# Patient Record
Sex: FEMALE | URBAN - METROPOLITAN AREA
[De-identification: names, ages, dates, MRNs, and addresses within clinical notes are randomized per-mention and may not be internally consistent; named-entity substitution may affect disease eponyms.]

---

## 2022-07-19 LAB
ANTIBODY SCREEN, EXTERNAL: NEGATIVE
CHLAMYDIA, EXTERNAL: NEGATIVE
HBSAG, EXTERNAL: NEGATIVE
HIV, EXTERNAL: NEGATIVE
N. GONORRHEA, EXTERNAL: NEGATIVE
RPR, EXTERNAL: NORMAL
RUBELLA, EXTERNAL: NORMAL
TYPE, ABO & RH, EXTERNAL: NORMAL

## 2023-01-25 LAB — GRBS, EXTERNAL: NEGATIVE

## 2023-02-10 ENCOUNTER — HOSPITAL ENCOUNTER (EMERGENCY)
Age: 28
Discharge: HOME | End: 2023-02-10
Attending: STUDENT IN AN ORGANIZED HEALTH CARE EDUCATION/TRAINING PROGRAM | Admitting: OBSTETRICS & GYNECOLOGY

## 2023-02-10 VITALS
BODY MASS INDEX: 25.27 KG/M2 | DIASTOLIC BLOOD PRESSURE: 67 MMHG | WEIGHT: 161 LBS | HEART RATE: 85 BPM | HEIGHT: 67 IN | OXYGEN SATURATION: 100 % | RESPIRATION RATE: 16 BRPM | TEMPERATURE: 98.9 F | SYSTOLIC BLOOD PRESSURE: 112 MMHG

## 2023-02-10 PROCEDURE — 75810000275 HC EMERGENCY DEPT VISIT NO LEVEL OF CARE

## 2023-02-10 PROCEDURE — 59025 FETAL NON-STRESS TEST: CPT

## 2023-02-10 PROCEDURE — 99283 EMERGENCY DEPT VISIT LOW MDM: CPT

## 2023-02-10 RX ORDER — DIPHENHYDRAMINE HCL 25 MG
25 CAPSULE ORAL ONCE
Status: DISCONTINUED | OUTPATIENT
Start: 2023-02-10 | End: 2023-02-10 | Stop reason: HOSPADM

## 2023-02-10 NOTE — H&P
History and Physical    Patient: Annika Deal MRN: [id number]  SSN: [id number]    YOB: 1995  Age: 24 y.o. Sex: female      Subjective:      Annika Deal is a 24 y.o. female  @ 39.0 wks gestation with KELSEY 2023 who reports \"Feeling tired of being pregnant and wanting an Elective Induction\". Reports not having had good sleep in the last couple of nights\", and only feels sporadic master anderson contractions. History reviewed. No pertinent past medical history. Past Surgical History:   Procedure Laterality Date    HX OTHER SURGICAL      wisdom teeth 2018      History reviewed. No pertinent family history. Social History     Tobacco Use    Smoking status: Never    Smokeless tobacco: Never   Substance Use Topics    Alcohol use: Not on file      Prior to Admission medications    Medication Sig Start Date End Date Taking? Authorizing Provider   PNV Comb #2-Iron-FA-Omega 3 29-1-400 mg cmpk Take  by mouth. Yes Provider, Historical        No Known Allergies    Review of Systems:  A comprehensive review of systems was negative except for that written in the History of Present Illness. Objective:     Vitals:    02/10/23 0033 02/10/23 003   BP:  112/67   Pulse:  85   Resp:  16   Temp:  98.9 °F (37.2 °C)   SpO2:  100%   Weight: 73 kg (161 lb)    Height: 5' 7\" (1.702 m)         Physical Exam:  EYE: negative  LUNG: clear to auscultation bilaterally  HEART: regular rate and rhythm, S1, S2 normal, no murmur, click, rub or gallop  ABDOMEN: soft, non-tender. Bowel sounds normal. No masses,  gravid. EXTREMITIES:  extremities normal, atraumatic, no cyanosis or edema  SKIN: Normal.  PSYCHIATRIC: No s/s of emotional distress. Feeling tired. SVE: 1cm/50/-2/ medium     Cat 1 tracing for approx 1hour 30 minutes - FHR baseline 140's, acels present, no decels, moderate variability. Contractions: Uterine irritability, mild uterine strength palpated in abdomen.    One moment at approx 01:15am after checking pt's cervix, a possible variable decel noted ,while pt was laying on her back. Pt is assisted to semi-fowlers and to her other side and FHR baseline immediately regulated to baseline again. Prolonged monitoring performed  per Dr. Hakeem Lazcano recommendation [facility]) and Cat 1 tracing remained thereafter. Assessment:     1) Term Gestation at 39.0 wks   2) Insomnia  - Pt to be given Benadryl 25 mg po for therapeutic rest at home. 3) False Labor - Reviewed with pt that since I am not her provate OB provider, I am not able to schedule her for an Elective Induction. Pt instructed to talk to her OB provider in her OB appt later this morning about having it scheduled. 4) Pt to be discharged home. Reviewed labor s/s and precautions. Pt to follow up with OB today at 9 am for her standard SUNITHA scheduled appt. Reviewed pt's case with Dr Zoë Edwards whom reviewed the [facility] strip with me and agrees with POC for discharge with SUNITHA today at 9 am with her standard OB provider.      Signed By: Sergio Duke CNM     February 10, 2023

## 2023-02-10 NOTE — PROGRESS NOTES
0034: Pt ambulating to room 207 stating she has been having irregular contrax and back pain throughout the day. She denies any Lof or vaginal bleeding and endorses +FM. EFM applied at this time. 0110: SVE by Darryle Delude. 1/50/-2.     9020-4013: Pt on back while reviewing plan of care. Pt turned on L side, then R side. [id number]: Pt changing positions in bed.     0200: Pt getting up to go to the bathroom. 0216Jocori PERDOMOM reviewing FHR. Beronica Caraballo for pt to be discharged home and keep her scheduled appointment in the morning. EFM taken off pt at this time. 0230: This RN at bedside to go over discharge instructions including master anderson and pregnancy precautions. Pt verbalizes understanding and all questions answered. 0171: Pt ambulating off unit with partner.

## 2023-02-15 ENCOUNTER — ANESTHESIA (OUTPATIENT)
Dept: LABOR AND DELIVERY | Age: 28
End: 2023-02-15
Payer: OTHER GOVERNMENT

## 2023-02-15 ENCOUNTER — HOSPITAL ENCOUNTER (INPATIENT)
Age: 28
LOS: 2 days | Discharge: HOME | End: 2023-02-17
Attending: STUDENT IN AN ORGANIZED HEALTH CARE EDUCATION/TRAINING PROGRAM | Admitting: OBSTETRICS & GYNECOLOGY
Payer: OTHER GOVERNMENT

## 2023-02-15 ENCOUNTER — ANESTHESIA EVENT (OUTPATIENT)
Dept: LABOR AND DELIVERY | Age: 28
End: 2023-02-15
Payer: OTHER GOVERNMENT

## 2023-02-15 PROBLEM — Z3A.39 39 WEEKS GESTATION OF PREGNANCY: Status: ACTIVE | Noted: 2023-02-15

## 2023-02-15 LAB
ABO + RH BLD: NORMAL
BLOOD GROUP ANTIBODIES SERPL: NORMAL
ERYTHROCYTE [DISTWIDTH] IN BLOOD BY AUTOMATED COUNT: 12.4 % (ref 11.5–14.5)
HCT VFR BLD AUTO: 38.3 % (ref 35–47)
HGB BLD-MCNC: 12.3 G/DL (ref 11.5–16)
MCH RBC QN AUTO: 31.9 PG (ref 26–34)
MCHC RBC AUTO-ENTMCNC: 32.1 G/DL (ref 30–36.5)
MCV RBC AUTO: 99.2 FL (ref 80–99)
NRBC # BLD: 0 K/UL (ref 0–0.01)
NRBC BLD-RTO: 0 PER 100 WBC
PLATELET # BLD AUTO: 160 K/UL (ref 150–400)
PMV BLD AUTO: 11.7 FL (ref 8.9–12.9)
RBC # BLD AUTO: 3.86 M/UL (ref 3.8–5.2)
SPECIMEN EXP DATE BLD: NORMAL
WBC # BLD AUTO: 5.7 K/UL (ref 3.6–11)

## 2023-02-15 PROCEDURE — 85027 COMPLETE CBC AUTOMATED: CPT

## 2023-02-15 PROCEDURE — 77010026065 HC OXYGEN MINIMUM MEDICAL AIR: Performed by: OBSTETRICS & GYNECOLOGY

## 2023-02-15 PROCEDURE — 65270000029 HC RM PRIVATE

## 2023-02-15 PROCEDURE — 59200 INSERT CERVICAL DILATOR: CPT | Performed by: OBSTETRICS & GYNECOLOGY

## 2023-02-15 PROCEDURE — 4A1HXCZ MONITORING OF PRODUCTS OF CONCEPTION, CARDIAC RATE, EXTERNAL APPROACH: ICD-10-PCS | Performed by: OBSTETRICS & GYNECOLOGY

## 2023-02-15 PROCEDURE — 74011000250 HC RX REV CODE- 250: Performed by: NURSE ANESTHETIST, CERTIFIED REGISTERED

## 2023-02-15 PROCEDURE — 75410000000 HC DELIVERY VAGINAL/SINGLE: Performed by: OBSTETRICS & GYNECOLOGY

## 2023-02-15 PROCEDURE — 65410000002 HC RM PRIVATE OB

## 2023-02-15 PROCEDURE — 76060000078 HC EPIDURAL ANESTHESIA: Performed by: ANESTHESIOLOGY

## 2023-02-15 PROCEDURE — 86900 BLOOD TYPING SEROLOGIC ABO: CPT

## 2023-02-15 PROCEDURE — 74011250636 HC RX REV CODE- 250/636: Performed by: NURSE ANESTHETIST, CERTIFIED REGISTERED

## 2023-02-15 PROCEDURE — 36415 COLL VENOUS BLD VENIPUNCTURE: CPT

## 2023-02-15 PROCEDURE — 0KQM0ZZ REPAIR PERINEUM MUSCLE, OPEN APPROACH: ICD-10-PCS | Performed by: OBSTETRICS & GYNECOLOGY

## 2023-02-15 PROCEDURE — 75410000002 HC LABOR FEE PER 1 HR: Performed by: OBSTETRICS & GYNECOLOGY

## 2023-02-15 PROCEDURE — 74011250636 HC RX REV CODE- 250/636: Performed by: STUDENT IN AN ORGANIZED HEALTH CARE EDUCATION/TRAINING PROGRAM

## 2023-02-15 PROCEDURE — 74011250636 HC RX REV CODE- 250/636: Performed by: ANESTHESIOLOGY

## 2023-02-15 PROCEDURE — 75410000003 HC RECOV DEL/VAG/CSECN EA 0.5 HR: Performed by: OBSTETRICS & GYNECOLOGY

## 2023-02-15 PROCEDURE — 74011250636 HC RX REV CODE- 250/636: Performed by: ADVANCED PRACTICE MIDWIFE

## 2023-02-15 PROCEDURE — 75810000275 HC EMERGENCY DEPT VISIT NO LEVEL OF CARE

## 2023-02-15 RX ORDER — CALCIUM CARBONATE 200(500)MG
400 TABLET,CHEWABLE ORAL
Status: DISCONTINUED | OUTPATIENT
Start: 2023-02-15 | End: 2023-02-15

## 2023-02-15 RX ORDER — MINERAL OIL
120 OIL (ML) ORAL ONCE
Status: ACTIVE | OUTPATIENT
Start: 2023-02-15 | End: 2023-02-15

## 2023-02-15 RX ORDER — OXYTOCIN/RINGER'S LACTATE 30/500 ML
0-20 PLASTIC BAG, INJECTION (ML) INTRAVENOUS
Status: DISCONTINUED | OUTPATIENT
Start: 2023-02-15 | End: 2023-02-15

## 2023-02-15 RX ORDER — DOCUSATE SODIUM 100 MG/1
100 CAPSULE, LIQUID FILLED ORAL 2 TIMES DAILY
Status: DISCONTINUED | OUTPATIENT
Start: 2023-02-15 | End: 2023-02-17 | Stop reason: HOSPADM

## 2023-02-15 RX ORDER — ZOLPIDEM TARTRATE 5 MG/1
5 TABLET ORAL
Status: DISCONTINUED | OUTPATIENT
Start: 2023-02-15 | End: 2023-02-17 | Stop reason: HOSPADM

## 2023-02-15 RX ORDER — NALOXONE HYDROCHLORIDE 0.4 MG/ML
0.4 INJECTION, SOLUTION INTRAMUSCULAR; INTRAVENOUS; SUBCUTANEOUS AS NEEDED
Status: DISCONTINUED | OUTPATIENT
Start: 2023-02-15 | End: 2023-02-17 | Stop reason: HOSPADM

## 2023-02-15 RX ORDER — HYDROCORTISONE ACETATE PRAMOXINE HCL 2.5; 1 G/100G; G/100G
CREAM TOPICAL AS NEEDED
Status: DISCONTINUED | OUTPATIENT
Start: 2023-02-15 | End: 2023-02-15 | Stop reason: CLARIF

## 2023-02-15 RX ORDER — ACETAMINOPHEN 325 MG/1
650 TABLET ORAL
Status: DISCONTINUED | OUTPATIENT
Start: 2023-02-15 | End: 2023-02-15

## 2023-02-15 RX ORDER — OXYTOCIN/RINGER'S LACTATE 30/500 ML
87.3 PLASTIC BAG, INJECTION (ML) INTRAVENOUS AS NEEDED
Status: DISCONTINUED | OUTPATIENT
Start: 2023-02-15 | End: 2023-02-15

## 2023-02-15 RX ORDER — DIPHENHYDRAMINE HCL 25 MG
25 CAPSULE ORAL
Status: DISCONTINUED | OUTPATIENT
Start: 2023-02-15 | End: 2023-02-17 | Stop reason: HOSPADM

## 2023-02-15 RX ORDER — KETOROLAC TROMETHAMINE 30 MG/ML
30 INJECTION, SOLUTION INTRAMUSCULAR; INTRAVENOUS
Status: DISPENSED | OUTPATIENT
Start: 2023-02-15 | End: 2023-02-16

## 2023-02-15 RX ORDER — OXYCODONE HYDROCHLORIDE 5 MG/1
10 TABLET ORAL
Status: ACTIVE | OUTPATIENT
Start: 2023-02-15 | End: 2023-02-16

## 2023-02-15 RX ORDER — SODIUM CHLORIDE 0.9 % (FLUSH) 0.9 %
5-40 SYRINGE (ML) INJECTION EVERY 8 HOURS
Status: DISCONTINUED | OUTPATIENT
Start: 2023-02-15 | End: 2023-02-15

## 2023-02-15 RX ORDER — OXYTOCIN/RINGER'S LACTATE 30/500 ML
87.3 PLASTIC BAG, INJECTION (ML) INTRAVENOUS AS NEEDED
Status: DISCONTINUED | OUTPATIENT
Start: 2023-02-15 | End: 2023-02-17 | Stop reason: HOSPADM

## 2023-02-15 RX ORDER — ONDANSETRON 2 MG/ML
4 INJECTION INTRAMUSCULAR; INTRAVENOUS
Status: DISCONTINUED | OUTPATIENT
Start: 2023-02-15 | End: 2023-02-15

## 2023-02-15 RX ORDER — OXYTOCIN/RINGER'S LACTATE 30/500 ML
10 PLASTIC BAG, INJECTION (ML) INTRAVENOUS AS NEEDED
Status: DISCONTINUED | OUTPATIENT
Start: 2023-02-15 | End: 2023-02-17 | Stop reason: HOSPADM

## 2023-02-15 RX ORDER — EPHEDRINE SULFATE/0.9% NACL/PF 50 MG/5 ML
10 SYRINGE (ML) INTRAVENOUS
Status: DISCONTINUED | OUTPATIENT
Start: 2023-02-15 | End: 2023-02-15

## 2023-02-15 RX ORDER — OXYTOCIN/RINGER'S LACTATE 30/500 ML
10 PLASTIC BAG, INJECTION (ML) INTRAVENOUS AS NEEDED
Status: DISCONTINUED | OUTPATIENT
Start: 2023-02-15 | End: 2023-02-15

## 2023-02-15 RX ORDER — ONDANSETRON 4 MG/1
4 TABLET, ORALLY DISINTEGRATING ORAL
Status: ACTIVE | OUTPATIENT
Start: 2023-02-15 | End: 2023-02-16

## 2023-02-15 RX ORDER — FENTANYL/BUPIVACAINE/NS/PF 2-1250MCG
12 PREFILLED PUMP RESERVOIR EPIDURAL CONTINUOUS
Status: DISCONTINUED | OUTPATIENT
Start: 2023-02-15 | End: 2023-02-15

## 2023-02-15 RX ORDER — OXYCODONE HYDROCHLORIDE 5 MG/1
5 TABLET ORAL
Status: ACTIVE | OUTPATIENT
Start: 2023-02-15 | End: 2023-02-16

## 2023-02-15 RX ORDER — LIDOCAINE HYDROCHLORIDE 10 MG/ML
20 INJECTION INFILTRATION; PERINEURAL ONCE
Status: ACTIVE | OUTPATIENT
Start: 2023-02-15 | End: 2023-02-15

## 2023-02-15 RX ORDER — SIMETHICONE 80 MG
80 TABLET,CHEWABLE ORAL
Status: DISCONTINUED | OUTPATIENT
Start: 2023-02-15 | End: 2023-02-17 | Stop reason: HOSPADM

## 2023-02-15 RX ORDER — MAG HYDROX/ALUMINUM HYD/SIMETH 200-200-20
30 SUSPENSION, ORAL (FINAL DOSE FORM) ORAL
Status: DISCONTINUED | OUTPATIENT
Start: 2023-02-15 | End: 2023-02-15

## 2023-02-15 RX ORDER — IBUPROFEN 800 MG/1
800 TABLET ORAL EVERY 8 HOURS
Status: DISCONTINUED | OUTPATIENT
Start: 2023-02-15 | End: 2023-02-17 | Stop reason: HOSPADM

## 2023-02-15 RX ORDER — SODIUM CHLORIDE 0.9 % (FLUSH) 0.9 %
5-40 SYRINGE (ML) INJECTION AS NEEDED
Status: DISCONTINUED | OUTPATIENT
Start: 2023-02-15 | End: 2023-02-15

## 2023-02-15 RX ORDER — NALOXONE HYDROCHLORIDE 0.4 MG/ML
0.2 INJECTION, SOLUTION INTRAMUSCULAR; INTRAVENOUS; SUBCUTANEOUS
Status: ACTIVE | OUTPATIENT
Start: 2023-02-15 | End: 2023-02-16

## 2023-02-15 RX ORDER — BUPIVACAINE HYDROCHLORIDE 2.5 MG/ML
INJECTION, SOLUTION EPIDURAL; INFILTRATION; INTRACAUDAL AS NEEDED
Status: DISCONTINUED | OUTPATIENT
Start: 2023-02-15 | End: 2023-02-15 | Stop reason: HOSPADM

## 2023-02-15 RX ORDER — HYDROCODONE BITARTRATE AND ACETAMINOPHEN 5; 325 MG/1; MG/1
1 TABLET ORAL
Status: DISCONTINUED | OUTPATIENT
Start: 2023-02-15 | End: 2023-02-17 | Stop reason: HOSPADM

## 2023-02-15 RX ORDER — NALBUPHINE HYDROCHLORIDE 10 MG/ML
10 INJECTION, SOLUTION INTRAMUSCULAR; INTRAVENOUS; SUBCUTANEOUS
Status: DISCONTINUED | OUTPATIENT
Start: 2023-02-15 | End: 2023-02-15

## 2023-02-15 RX ADMIN — OXYTOCIN 6 MILLI-UNITS/MIN: 10 INJECTION INTRAVENOUS at 19:56

## 2023-02-15 RX ADMIN — BUPIVACAINE HYDROCHLORIDE 10 ML: 2.5 INJECTION, SOLUTION EPIDURAL; INFILTRATION; INTRACAUDAL at 11:30

## 2023-02-15 RX ADMIN — Medication 12 ML/HR: at 18:24

## 2023-02-15 RX ADMIN — NALBUPHINE HYDROCHLORIDE 10 MG: 10 INJECTION, SOLUTION INTRAMUSCULAR; INTRAVENOUS; SUBCUTANEOUS at 03:16

## 2023-02-15 RX ADMIN — SODIUM CHLORIDE, POTASSIUM CHLORIDE, SODIUM LACTATE AND CALCIUM CHLORIDE 1000 ML: 600; 310; 30; 20 INJECTION, SOLUTION INTRAVENOUS at 13:00

## 2023-02-15 RX ADMIN — KETOROLAC TROMETHAMINE 30 MG: 30 INJECTION, SOLUTION INTRAMUSCULAR; INTRAVENOUS at 23:06

## 2023-02-15 RX ADMIN — OXYTOCIN 2 MILLI-UNITS/MIN: 10 INJECTION INTRAVENOUS at 13:35

## 2023-02-15 RX ADMIN — NALBUPHINE HYDROCHLORIDE 10 MG: 10 INJECTION, SOLUTION INTRAMUSCULAR; INTRAVENOUS; SUBCUTANEOUS at 07:02

## 2023-02-15 RX ADMIN — Medication 12 ML/HR: at 12:19

## 2023-02-15 NOTE — PROGRESS NOTES
[facility] Labor Progress Note     Patient: Jett Escobedo MRN: [id number]  SSN: [id number]    YOB: 1995  Age: 24 y.o. Sex: female        Subjective:   Patient coping well with contractions with IV pain medication. Objective:   Blood pressure 104/70, pulse 100, temperature 98.6 °F (37 °C), resp. rate 16, height 5' 7\" (1.702 m), weight 74.8 kg (165 lb), SpO2 100 %. Fetal heart baseline 130's ,  moderate  variability, accelerations present, no decelerations, Uterine contractions have spaced out to approx 5 minutes, moderate  to palpation, resting tone soft. Sterile Vaginal Exam: 2.5 cm dilated / 80 % effaced/  -2 station, cervix midline , fetal presentation vertex, membranes intact. Assessment:     39w5d  Category 1 fetal heart rate tracing   Labor   GBS: negative   IOL due to Non Reassuring FHR tracing upon admission to the hospital.     Plan:   Cervical Cook Catheter was inflated to 60 ml in the uterus and 40 ml in the vagina with NS. Pt tolerated procedure well.    Continue current orders/management   CNM management   Anticipate     Philly Gavin CNM

## 2023-02-15 NOTE — PROGRESS NOTES
Labor Note    Jett Escobedo  [id number]  1995   39w5d      S:  Feeling comfortable w/ PCEA. Resting. O:    Visit Vitals  /67   Pulse 80   Temp 97.7 °F (36.5 °C)   Resp 16   Ht 5' 7\" (1.702 m)   Wt 74.8 kg (165 lb)   SpO2 100%   BMI 25.84 kg/m²     Cervical Exam  Dilation (cm): 6  Eff: 50 %  Station: -2  Position: Mid  Cervical Consistency: Soft  Vaginal exam done by? : Dr. Radha Savage Position: Vertex  Membrane Status: Intact    Unchanged, feels like a anaya cervix     Patient Vitals for the past 4 hrs: Mode Fetal Heart Rate Variability Decelerations Accelerations RN Reviewed Strip? FHR Interventions   02/15/23 1315 -- 130 -- -- -- Yes --   02/15/23 1300 Internal 125 6-25 BPM (!) Late Yes Yes --   02/15/23 1245 -- 125 -- -- -- Yes --   02/15/23 1230 Internal 125 6-25 BPM (!) Late Yes Yes --   02/15/23 1215 -- 125 -- -- -- Yes --   02/15/23 1200 Internal 125 6-25 BPM None Yes Yes --   02/15/23 1145 Internal 120 6-25 BPM None No Yes --   02/15/23 1126 -- -- -- -- -- -- FSE Applied   02/15/23 1118 -- -- -- -- -- -- Lateral Right   02/15/23 1115 -- -- -- -- -- -- IV Fluid Bolus; Lateral Left   02/15/23 1000 External 115 6-25 BPM None Yes Yes --   02/15/23 0945 External 120 6-25 BPM None Yes Yes --       A/P:   24 y.o.  @ 39w5d - early labor, augmentation for nrfht  - GBS neg / Rhpos  - FWB:  CEFM/[place]  - Labor course - IOL for NRFHT    Balloon placed and came out    Edilia Mendoza was ruptured and FSE placed when she had decel post epidural     Cervix rechecked after that and she feels like a anaya /-2    She is unchanged at this time    Pitocin was not started because of intermittent late decels    IUPC now placed to titrate contractions    Discussed with pt that we need to start the pitocin in order to cause cervical change as the reason she is being induced is for suspicion of uteroplacental insufficiency (nonreassring fht w/ late decels).  She has had 4 lates over the last 13 contractions, overall in between she has a reassuring tracing with accels and moderate variability. I did discuss with her that she may need a  delivery. She strongly does not desire one. I advised that if we cannot go up on pitocin because of decelerations or if she has recurrent late decelerations a  will be indicated given unknown long term neurologic affects on baby. Pt wants to do everything she can to avoid the . Will continuously reeval strip   - Pain control - pcea  - Anticipate .       Beau Thompson MD  [facility]

## 2023-02-15 NOTE — PROGRESS NOTES
2/15/2023  9:56 AM    CM met with ERWIN to complete initial assessment and begin discharge planning. MOB verified and confirmed demographics. ERWIN lives with MARY Harper ( [phone]), at the address on file. ERWIN is employed and plans to take adequate time off from work. BRANNON is also employed and will be taking time off. ERWIN reports she has good family support, and feels like she has the support she needs when she returns home. ERWIN plans to breast feed baby and has pump to use at home. Family plans to go to [place] and stay there with family for a few months. ERWIN has car seat, bassinet/crib, clothing, bottles and all necessary supplies for baby. ERWIN has [employer], and will be adding baby to this policy. CM discussed process to add baby to insurance, MOB verbalized understanding. Care Management Interventions  PCP Verified by CM: Yes (Yung Mansfield)  Mode of Transport at Discharge:  Other (see comment)  Transition of Care Consult (CM Consult): Discharge Planning  Support Systems: Spouse/Significant Other, Other Family Member(s)  Confirm Follow Up Transport: Family  Discharge Location  Patient Expects to be Discharged to[de-identified] Home with family assistance  Kyaw Olivarez

## 2023-02-15 NOTE — PROGRESS NOTES
0715 Bedside SBAR rec'd from [address] Patient on knees on pillow leaning forward on elbows on arms. Partner at bedside with patient. 4909 Patient bolusing for epidural.  1000 Anesthesia called for epidural.  1030 Anesthesia called for epidural placement; patient's pain increasing. Comfort measures include pressure applied to patient's back and hips during contractions per patient's request.  Massage to shoulders and back. 200 Dr. Sher Segal at bedside for epidural placement. Patient positioned per MD request.  5031 Time out. 1103 Test dose. MD called to emergent situation; will return. 1107 Patient laid flat. Vital signs stable. [address] Byrne Mcclellan down; Patient laid left lateral and placed in trendelenburg. [address] Byrne Mcclellan still down; cord pulled for RN assistance. Liliam Mcarthur, charge RN, at bedside. MD called. Baby back on monitor. [address] came out with no resistance. 36 Dr. Ashanti Hernadez at bedside for eval; AROM. FSE placed. 1123 Ephedrine given by Dr. Sher Segal. 1125 Bolus dose given. 305 [facility] Dr. Della Mtz at bedside for SVE 6/50/-2. Instructed to start pitocin in approx. 30 minutes once patient is comfortable and if baby HR remains WNL. 1157 Patient on right lateral, resting comfortably after bolus dose. VSS. Will continue to monitor. 1220 Late decel noted; patient on left lateral; moved to right lateral. Will delay start of pitocin. 1301 Late decel noted. Dr. Della Mtz aware. Will continue to review strip and consider holding/starting pitocin. 1 Dr. Della Mtz at bedside for SVE 6/50/-2 and to place FSE. Pitocin ordered to be started and titrated as tolerated. Patient on right lateral.  1530 Per Dr. Della Mtz, increase pitocin to 10 lux-units/min when due and leave for at least an hour. MD will return to unit to further assess. 1730 Patient given IV fluid bolus for HR in 120s; patient asymptomatic and afebrile (see flowsheet), and resting quietly on right lateral with pillows for positioning.    Brigette Hernandez Dr. Heide at bedside for SVE. 6/50/+1. Patient's pads changed along with position change to left lateral (flying cowgirl) with peanut ball.   1900 Bedside SBAR given to Lucero Pandey.

## 2023-02-15 NOTE — PROGRESS NOTES
Labor Note    Esthela Liang  [id number]  1995   39w5d      S:  Feeling very uncomfortable     O:    Visit Vitals  BP (!) 109/59   Pulse (!) 101   Temp 97.7 °F (36.5 °C)   Resp 16   Ht 5' 7\" (1.702 m)   Wt 74.8 kg (165 lb)   SpO2 100%   BMI 25.84 kg/m²     Cervical Exam  Dilation (cm): 2.5  Eff: 80 %  Station: -2  Position: Mid  Cervical Consistency: Soft  Vaginal exam done by? : Rhea Rumps CNM  Baby Position: Vertex  Membrane Status: Intact    Patient Vitals for the past 4 hrs: Mode Fetal Heart Rate Variability Decelerations Accelerations RN Reviewed Strip?   02/15/23 0830 External 120 6-25 BPM None Yes Yes   02/15/23 0815 External 120 6-25 BPM None Yes Yes   02/15/23 0800 External 120 6-25 BPM None Yes Yes   02/15/23 0745 -- 115 -- -- -- Yes   02/15/23 0730 External 120 6-25 BPM None No Yes   02/15/23 0630 External 135 6-25 BPM None No Yes   02/15/23 0600 External 125 6-25 BPM None Yes Yes   02/15/23 0530 External 125 6-25 BPM None No Yes       A/P:  24 y.o.  @ 39w5d - early labor, augmentation for nrfht  - GBS neg / Rhpos  - FWB:  CEFM/[place]  - Labor course balloon placed by cm 60/40 thi divina at 6, 2.5 cm before that, ctx on her own, will start low dose pit after pcea as patient is very uncomfortable w ctx  - Pain control - pcea now  - Anticipate .       Romain Saha MD  [facility]

## 2023-02-15 NOTE — H&P
History & Physical    Name: Piper Esparza MRN: [id number]  SSN: [id number]    YOB: 1995  Age: 24 y.o. Sex: female        Subjective:   Chief Complaint: Contractions  Estimated Date of Delivery: 23  OB History    Para Term  AB Living   1             SAB IAB Ectopic Molar Multiple Live Births                    # Outcome Date GA Lbr George/2nd Weight Sex Delivery Anes PTL Lv   1 Current                Gerardo Godinez, 24 y.o.,  ,  presents at 39w5d, complaining of Contractions. Her contractions started  at 0400. They are now regular and painful, coming every 2 - 3 minutes and lasting a minute. She denies leaking of fluid, and vaginal bleeding. Prenatal course was normal. Please see prenatal records for details. No Known Allergies    Prior to Admission medications    Medication Sig Start Date End Date Taking? Authorizing Provider   PNV Comb #2-Iron-FA-Omega 3 29-1-400 mg cmpk Take  by mouth. Provider, Historical       History reviewed. No pertinent past medical history. Past Surgical History:   Procedure Laterality Date    HX OTHER SURGICAL      wisdom teeth 2018       Social History     Occupational History    Not on file   Tobacco Use    Smoking status: Never    Smokeless tobacco: Never   Substance and Sexual Activity    Alcohol use: Never    Drug use: Never    Sexual activity: Not on file       History reviewed. No pertinent family history. Review of Systems: A comprehensive review of systems was negative except for that written in the HPI. Objective:     Physical Exam:    Visit Vitals  Ht 5' 7\" (1.702 m)   Wt 74.8 kg (165 lb)   BMI 25.84 kg/m²       General:   24 y.o.  female who appears uncomfortable with contractions. HEENT:   Normocephalic. Pupils are equal, round, and reactive to light. Extraocular movements are intact. Pharynx is clear. Neck:   Normal range of motion. No thyromegaly.    Heart:   Regular rate and rhythm. No murmurs present. Lungs:   Clear, no rales, rhonchi, or wheezes. Abdomen:   Soft, gravid, not tender, normal bowel sounds. No CVA tenderness   Leopold's: Vertex   EFW 7.5 pounds   Uterine Activity:    Frequency: Every 2 minutes    Duration: 60 seconds    Intensity: Moderate  Fetal Heart Rate:     Baseline: 140 bpm    Variability: Moderate    Accelerations: Present (15 x 15 bpm)    Decelerations: None  Category: 1   Membranes: Intact   Cervical Exam:     Position Posterior    Condition Soft  Presentation Vertex  Dilation 2 cms    Effacement 80 %     Station -2    Exam by RN   Pelvis is adequate for vaginal delivery. Extremites:   Trace bilateral pedal edema. Full range of motion. Neuro:    Alert. Oriented. CN 2 - 12 intact. Motor and sensory exam grossly normal.      Prenatal Labs   Lab Results   Component Value Date/Time    Rubella, External immune 07/19/2022 12:00 AM    GrBStrep, External negative 01/25/2023 12:00 AM    HBsAg, External negative 07/19/2022 12:00 AM    HIV, External negative 07/19/2022 12:00 AM    RPR, External non-reactive 07/19/2022 12:00 AM    Gonorrhea, External negative 07/19/2022 12:00 AM    Chlamydia, External negative 07/19/2022 12:00 AM    ABO,Rh A positive 07/19/2022 12:00 AM     No results found for this or any previous visit (from the past 12 hour(s)). Assessment/Plan:     Active Hospital Problems    Diagnosis Date Noted    39 weeks gestation of pregnancy 02/15/2023     Priority: 1 - One       39 weeks gestation of pregnancy  She is in the latent phase of labor. There were two variable fetal heart rate decelerations, that resolved. I will admit for L&D, and augment as necessary, if the labor stalls.       Macy Newby MD  2/15/2023

## 2023-02-15 NOTE — PROGRESS NOTES
OBH    Called to see patient after decel.  Test dose given for epidural. Did get ephedrine x 1    Cervix C/8/0  AROM clear  FSE placed    Primary MD aware and OTW

## 2023-02-15 NOTE — PROGRESS NOTES
Labor Note    [place] Code  [id number]  1995   39w5d      S:  Feeling better w/ pcea    Called to bedside bc postepidural decel  Dr Joellen Richmond arrived before I did, exam at that time was c//0   BBOW was broken and FSE was placed     O:    Visit Vitals  BP (!) 109/59   Pulse (!) 101   Temp 97.7 °F (36.5 °C)   Resp 16   Ht 5' 7\" (1.702 m)   Wt 74.8 kg (165 lb)   SpO2 100%   BMI 25.84 kg/m²     Dilation 6  Effacement 50       Patient Vitals for the past 4 hrs: Mode Fetal Heart Rate Variability Decelerations Accelerations RN Reviewed Strip?   02/15/23 1000 External 115 6-25 BPM None Yes Yes   02/15/23 0945 External 120 6-25 BPM None Yes Yes   02/15/23 0930 External 115 6-25 BPM None Yes Yes   02/15/23 0915 -- 120 -- -- -- Yes   02/15/23 0900 External 120 6-25 BPM None Yes Yes   02/15/23 0845 -- 120 -- -- -- Yes   02/15/23 0830 External 120 6-25 BPM None Yes Yes   02/15/23 0815 External 120 6-25 BPM None Yes Yes   02/15/23 0800 External 120 6-25 BPM None Yes Yes   02/15/23 0745 -- 115 -- -- -- Yes       A/P:  24 y.o.  @ 39w5d - early labor, augmentation for nrfht  - GBS neg / Rhpos  - FWB:  CEFM/[place]  - Labor course balloon placed by cm 60/40 thi divina at 6, 2.5 cm before that, balloon came out and pt had postepidural decel, at that time bbow was broken and cervix was called 8 likely 2/2 pressure from bbow. Cervix now 6/50/-2, start pit once 30 min of recovery to cat 1   - Pain control - pcea  - Anticipate .       Joe Bain MD  [facility]

## 2023-02-15 NOTE — ANESTHESIA PROCEDURE NOTES
Epidural Block    Patient location during procedure: OB  Start time: 2/15/2023 10:47 AM  End time: 2/15/2023 10:57 AM  Reason for block: labor epidural  Staffing  Performed: attending   Anesthesiologist: Farnk Wiggins MD  Preanesthetic Checklist  Completed: patient identified, IV checked, site marked, risks and benefits discussed, surgical consent, monitors and equipment checked, pre-op evaluation, timeout performed and fire risk safety assessment completed and verbalized  Block Placement  Patient position: sitting  Prep: ChloraPrep  Sterility prep: mask, hand, gloves, drape and cap  Sedation level: no sedation  Patient monitoring: heart rate and continuous pulse oximetry  Approach: midline  Location: lumbar  Lumbar location: L2-L3  Epidural  Loss of resistance technique: air  Guidance: landmark technique  Needle  Needle type: Tuohy   Needle gauge: 17 G  Needle length: 9 cm  Needle insertion depth: 5.5 cm  Catheter type: multi-orifice  Catheter size: 20 G  Catheter at skin depth: 9.5 cm  Catheter securement method: clear occlusive dressing  Test dose: negative  Assessment  Number of attempts: 1

## 2023-02-15 NOTE — PROGRESS NOTES
Progress note    Entry from 445 pm     Patient comfortable   Pit at 10  Mvu 200 over the last 30 min   Will defer exam until adequate x 2 hours

## 2023-02-15 NOTE — ASSESSMENT & PLAN NOTE
She is in the latent phase of labor. There were two variable fetal heart rate decelerations, that resolved. I will admit for L&D, and augment as necessary, if the labor stalls.

## 2023-02-16 PROCEDURE — 74011250637 HC RX REV CODE- 250/637: Performed by: OBSTETRICS & GYNECOLOGY

## 2023-02-16 PROCEDURE — 65270000029 HC RM PRIVATE

## 2023-02-16 RX ORDER — PEPPERMINT OIL
SPIRIT ORAL
Status: DISPENSED
Start: 2023-02-16 | End: 2023-02-16

## 2023-02-16 RX ADMIN — HYDROCODONE BITARTRATE AND ACETAMINOPHEN 1 TABLET: 5; 325 TABLET ORAL at 11:51

## 2023-02-16 RX ADMIN — DOCUSATE SODIUM 100 MG: 100 CAPSULE, LIQUID FILLED ORAL at 18:02

## 2023-02-16 RX ADMIN — HYDROCODONE BITARTRATE AND ACETAMINOPHEN 1 TABLET: 5; 325 TABLET ORAL at 05:53

## 2023-02-16 RX ADMIN — DOCUSATE SODIUM 100 MG: 100 CAPSULE, LIQUID FILLED ORAL at 08:16

## 2023-02-16 RX ADMIN — HYDROCODONE BITARTRATE AND ACETAMINOPHEN 1 TABLET: 5; 325 TABLET ORAL at 01:43

## 2023-02-16 RX ADMIN — IBUPROFEN 800 MG: 800 TABLET, FILM COATED ORAL at 13:51

## 2023-02-16 RX ADMIN — IBUPROFEN 800 MG: 800 TABLET, FILM COATED ORAL at 05:52

## 2023-02-16 RX ADMIN — HYDROCODONE BITARTRATE AND ACETAMINOPHEN 1 TABLET: 5; 325 TABLET ORAL at 18:02

## 2023-02-16 RX ADMIN — IBUPROFEN 800 MG: 800 TABLET, FILM COATED ORAL at 21:59

## 2023-02-16 NOTE — PROGRESS NOTES
OBH    C/9/+2/ROT  Some late intermittent late occurring decels but mod variability and accel with scalp stim  Pit turned down now  Making excellent progress now  Hopefully  if FHT continue without sig decels

## 2023-02-16 NOTE — PROGRESS NOTES
1900- report received from CHRISTO Barroso RN   1910- pt in left side flying cowgirl with peanut ball   1925- turning positions to supine Neal Lavender- Dr. Jose Alvarez at bedside   1928- pt turned to right lateral, pitocin stopped, fluids started at 999 ml/hr  1930- SVE 8/80/+1 by Dr. Chico Berrios- pt in right lateral with left leg in stirrup and right leg behind.  Fluids turned back down to 125 ml/hr

## 2023-02-16 NOTE — LACTATION NOTE
This note was copied from a baby's chart. Mom states baby previous had long robust feed and now drowsy. Reviewed gentle ways to wake. Taught parents how to perform manual expression of colostrum and several drops offered now via finger. Mom assisted to both football and cross cradle position. Baby drows and spitty. Latched briefly and a few suckles noted. Mom encouraged to keep baby skin to skin and offer additional hand expressed colostrum. To offer breast again in one hour or to early cues of hunger. Normal  behaviors and adequate output reviewed. Discussed with mother her plan for feeding. Reviewed the benefits of exclusive breast milk feeding during the hospital stay. Informed her of the risks of using formula to supplement in the first few days of life as well as the benefits of successful breast milk feeding; referred her to the Breastfeeding booklet about this information. She acknowledges understanding of information reviewed and states that it is her plan to blreastfeed her infant. Will support her choice and offer additional information as needed. Reviewed breastfeeding basics:  How milk is made and normal  breastfeeding behaviors discussed. Supply and demand,  stomach size, early feeding cues, skin to skin bonding with comfortable positioning and baby led latch-on reviewed. How to identify signs of successful breastfeeding sessions reviewed; education on asymetrical latch, signs of effective latching vs shallow, in-effective latching, normal  feeding frequency and duration and expected infant output discussed. Normal course of breastfeeding discussed including the AAP's recommendation that children receive exclusive breast milk feedings for the first six months of life with breast milk feedings to continue through the first year of life and/or beyond as complimentary table foods are added.   Breastfeeding Booklet and Warm line information provided with discussion. Discussed typical  weight loss and the importance of pediatrician appointment within 24-48 hours of discharge, at 2 weeks of life and normalcy of requesting pediatric weight checks as needed in between visits. Hand Expression Education:  Mom taught how to manually hand express her colostrum. Emphasized the importance of providing infant with valuable colostrum as infant rests skin to skin at breast.  Aware to avoid extended periods of non-feeding. Aware to offer 10-20+ drops of colostrum every 2-3 hours until infant is latching and nursing effectively. Taught the rationale behind this low tech but highly effective evidence based practice. Pt will successfully establish breastfeeding by feeding in response to early feeding cues   or wake every 3h, will obtain deep latch, and will keep log of feedings/output. Taught to BF at hunger cues and or q 2-3 hrs and to offer 10-20 drops of hand expressed colostrum at any non-feeds.       Breast Assessment  Left Breast: Medium, Large  Left Nipple: Everted, Intact  Right Breast: Medium, Large  Right Nipple: Everted, Intact  Breast- Feeding Assessment  Attends Breast-Feeding Classes: No  Breast-Feeding Experience: No  Breast Trauma/Surgery: No  Type/Quality: [facility]  Lactation Consultant Visits  Breast-Feedings: Fair  Mother/Infant Observation  Mother Observation: Breast comfortable, Alignment, Close hold, Holds breast, Lets baby end feeding, Nipple round on release, Recognizes feeding cues  Infant Observation: Audible swallows, Breast tissue moves, Lips flanged, lower, Lips flanged, upper, Opens mouth, Feeding cues, Latches nipple and aereolae  LATCH Documentation  Latch: Repeated attempts, hold nipple in mouth, stimulate to suck  Audible Swallowing: None  Type of Nipple: Everted (after stimulation)  Comfort (Breast/Nipple): Soft/non-tender  Hold (Positioning): Full assist, teach one side, mother does other, staff holds  [facility] Score: 6

## 2023-02-16 NOTE — PROGRESS NOTES
PostPartum Note    Isabella Daly  [id number]  1995  24 y.o.    S:  Ms. Isabella Daly is a 24 y.o.  PPD #1 s/p VAVD @ 39w5d. Doing well. She had a baby boy. Her lochia is like a period. She describes her pain as mild and is well controlled with PO medications. She is breast feeding and this is going well. She is ambulating and voiding. Tolerating PO intake. O:   Visit Vitals  /69 (BP 1 Location: Left upper arm)   Pulse 91   Temp 98.4 °F (36.9 °C)   Resp 16   Ht 5' 7\" (1.702 m)   Wt 74.8 kg (165 lb)   SpO2 97%   Breastfeeding Unknown   BMI 25.84 kg/m²       Lab Results   Component Value Date/Time    WBC 5.7 02/15/2023 02:47 AM    HGB 12.3 02/15/2023 02:47 AM    HCT 38.3 02/15/2023 02:47 AM    PLATELET 167  02:47 AM    MCV 99.2 (H) 02/15/2023 02:47 AM       Gen - No acute distress  Abdomen - Fundus firm, below the umbilicus   Ext - Warm, well perfused. Nontender    A/P:  PPD #1 s/p VAVD @ 39w5d doing well. 1.  Routine PP instructions/ care discussed  2. Blood type - Rh pos  3. Rubella imm  4. Circumcision desired and consented   5. Discharge home tomorrow    6. F/U 4-6 weeks for PP check.       Dagoberto Steinberg MD  [facility]

## 2023-02-16 NOTE — L&D DELIVERY NOTE
Delivery Summary    Patient: Aida Jones MRN: [id number]  SSN: [id number]    YOB: 1995  Age: 24 y.o. Sex: female     Persistent late decels. FHT to 80's with slow recovery with pushing. +4 ROT. Kwi applied, max pressure 600mmHg. One pop off, min descent. Kiwi removed. Head manually rotated to OA easily. Pt able to push a few contractions until FHR persistently down again. RML epis performed. Kiwi applied. Proper position confirmed. One contraction vertex delivered. Infant vigorous and cried immediately. Placenta spontaneous. RML and 2nd degree vaginal lac repaired with monocryl. No NC but short cord. Apgars 9,9. Nicu present at delivery. Cord pH pending            Information for the patient's :  Joselyn Suarez, Male Dali Rivers [[id number]]     Labor Events:    Labor:      Steroids:     Cervical Ripening Date/Time:       Cervical Ripening Type:     Antibiotics During Labor:     Rupture Identifier:      Rupture Date/Time: 2/15/2023 11:22 AM   Rupture Type:     Amniotic Fluid Volume:      Amniotic Fluid Description: Clear    Amniotic Fluid Odor: None    Induction:         Induction Date/Time:        Indications for Induction:      Augmentation: Oxytocin;AROM; Cervical balloon   Augmentation Date/Time:      Indications for Augmentation: Ineffective Contraction Pattern;Fetal Heart Rate or Rhythm Abnormality   Labor complications: None       Additional complications:        Delivery Events:  Indications For Episiotomy: Other (See Note)   Episiotomy: Right Mediolateral   Perineal Laceration(s): 2nd   Repaired: Yes   Periurethral Laceration Location:      Repaired:     Labial Laceration Location:     Repaired:     Sulcal Laceration Location:     Repaired:     Vaginal Laceration Location:     Repaired:     Cervical Laceration Location:     Repaired:     Repair Suture: Monocryl 3-0   Number of Repair Packets:     Estimated Blood Loss (ml):  ml   Quantitative Blood Loss (ml): Delivery Date: 2/15/2023    Delivery Time: 9:20 PM    Delivery Type: Vaginal, Vacuum (Extractor)  Vacuum attempted? No    Vacuum indication: Fetal Heart Rate or Rhythm Abnormality   Vacuum type: Kiwi Omni Cup (mushroom)   Application location: Low   First Attempt     Time applied: 2/15/2023  9:12 PM   Time removed: 2/15/2023  9:13 PM   Second Attempt    Time applied:     Time removed: Third Attempt    Time applied:     Time removed:     Number of pulls: 2   Number of pop-offs: 1   Low-end pressure range:     High-end pressure range: Total application time: 2 minutes   Applied by: Rogelio Hackett   Failed? No     Sex:  Male     Gestational Age: 38w11d  Delivery Clinician:  Yanci Linda  Living Status: Living   Delivery Location: L&D            APGARS  One minute Five minutes Ten minutes   Skin color: 1   1        Heart rate: 2   2        Grimace: 2   2        Muscle tone: 2   2        Breathin   2        Totals: 9   9          Presentation: Vertex    Position: Left Occiput Anterior  Resuscitation Method:  Tactile Stimulation;Suctioning-bulb     Meconium Stained: Terminal      Cord Information: 3 Vessels  Complications: None  Cord around:    Delayed cord clamping?  Yes  Cord clamped date/time:2/15/2023  9:21 PM  Disposition of Cord Blood: Lab    Blood Gases Sent?: Yes    Placenta:  Date/Time: 2/15/2023  9:22 PM  Removal: Expressed      Appearance: Normal     Prospect Measurements:  Birth Weight: 7 lb 8.6 oz (3.42 kg)      Birth Length: 1' 8.5\" (0.521 m)      Head Circumference: 1' 1.39\" (0.34 m)      Chest Circumference: 1' 1.78\" (0.35 m)     Abdominal Girth: 11.81\" (0.3 m)    Other Providers:   JEB MOROCHO;CARLITO CANADA;BRAIN GARCIA;MARY JEAN BAPTISTE;YESY SALAZAR;EMERITA GAO;SHEN BENDER;ROSALIE BRYANT;MARY KATE MATHIS, Obstetrician;Primary Nurse;Primary  Nurse;Nicu Nurse;Charge Nurse;Nurse Practitioner;Student Nurse;Scrub Tech;Respiratory Therapist     The indication, risks, and benefits of vacuum delivery compared to  delivery were discussed with the patient and attendant family member. All questions were answered. Bladder was drained prior to instrumentation. Instrumentation easily achieved and positioning was checked and verified prior to attempt at deliver. Group B Strep:   Lab Results   Component Value Date/Time    Andrewtrep, External negative 2023 12:00 AM     Information for the patient's :  Negrita Mangle, Male Monica Corner [[id number]]   No results found for: ABORH, PCTABR, PCTDIG, BILI, ABORHEXT, ABORH   No results for input(s): PCO2CB, PO2CB, HCO3I, SO2I, IBD, PTEMPI, SPECTI, PHICB, ISITE, IDEV, IALLEN in the last 72 hours.         Kendra Naylor MD  [facility]ist  [facility]

## 2023-02-16 NOTE — ROUTINE PROCESS
Bedside and Verbal shift change report given to LUIS FERNANDO Olivas (oncoming nurse) by Tay Castle RN (offgoing nurse). Report included the following information SBAR, Kardex, Intake/Output, MAR, and Recent Results.

## 2023-02-16 NOTE — PROGRESS NOTES
Labor Progress Note    OB Hospitalist    Patient seen, fetal heart rate and contraction pattern evaluated, patient examined. Visit Vitals  BP (!) 103/59   Pulse (!) 118   Temp 98.7 °F (37.1 °C)   Resp 16   Ht 5' 7\" (1.702 m)   Wt 165 lb (74.8 kg)   SpO2 100%   BMI 25.84 kg/m²       Physical Exam:  Cervical Exam:  6/50 %/-2/Mid  Membranes:  Artificial Rupture of Membranes; Amniotic Fluid: medium amount of clear fluid  Uterine Activity: Frequency: Every 2-3 minutes - adequate by IUPC  Fetal Heart Rate: Reactive    Assessment/Plan:  Reassuring fetal status, Continue plan for vaginal delivery    No significant change. Feels ROP. +1 station but thick from 6-12 on the right side  Will try spinning maneuvers.  Recheck in one hour    Angelica Trujillo MD  [facility] Hospitalist  [facility]

## 2023-02-16 NOTE — ROUTINE PROCESS
SBAR IN Report: Mother    Verbal report received from BRANDON Cash RN (full name & credentials) on this patient, who is now being transferred from L&D (unit) for routine progression of care. Report consisted of patient's Situation, Background, Assessment and Recommendations (SBAR).  ID bands were compared with the identification form, and verified with the patient and transferring nurse. Information from the SBAR, Kardex, Intake/Output, MAR, and Recent Results and the [place] Report was reviewed with the transferring nurse; opportunity for questions and clarification provided.

## 2023-02-17 VITALS
HEART RATE: 98 BPM | RESPIRATION RATE: 16 BRPM | OXYGEN SATURATION: 98 % | DIASTOLIC BLOOD PRESSURE: 74 MMHG | TEMPERATURE: 98.8 F | BODY MASS INDEX: 25.9 KG/M2 | WEIGHT: 165 LBS | SYSTOLIC BLOOD PRESSURE: 105 MMHG | HEIGHT: 67 IN

## 2023-02-17 PROCEDURE — 74011250637 HC RX REV CODE- 250/637: Performed by: OBSTETRICS & GYNECOLOGY

## 2023-02-17 RX ADMIN — IBUPROFEN 800 MG: 800 TABLET, FILM COATED ORAL at 07:43

## 2023-02-17 NOTE — PROGRESS NOTES
PostPartum Note    Beau Shah  [id number]  1995  24 y.o.    S:  Ms. Beau Shah is a 24 y.o.  PPD #2 s/p VAVD @ 39w5d. Doing well. She had a baby boy. Her lochia is like a period. She describes her pain as mild and is well controlled with PO medications. She is breast feeding and this is going well. She is ambulating and voiding. Tolerating PO intake. O:   Visit Vitals  BP (!) 94/59 (BP 1 Location: Right upper arm, BP Patient Position: At rest)   Pulse 83   Temp 98.8 °F (37.1 °C)   Resp 16   Ht 5' 7\" (1.702 m)   Wt 74.8 kg (165 lb)   SpO2 98%   Breastfeeding Unknown   BMI 25.84 kg/m²       Lab Results   Component Value Date/Time    WBC 5.7 02/15/2023 02:47 AM    HGB 12.3 02/15/2023 02:47 AM    HCT 38.3 02/15/2023 02:47 AM    PLATELET 758  02:47 AM    MCV 99.2 (H) 02/15/2023 02:47 AM       Gen - No acute distress  Abdomen - Fundus firm, below the umbilicus   Ext - Warm, well perfused. Nontender    A/P:  PPD #2 s/p VAVD @ 39w5d doing well. 1.  Routine PP instructions/ care discussed  2. Blood type - Rh +  3. Rubella imm  4. Circumcision today   5. Discharge today   6. F/U 4-6 weeks for PP check.       Derick Gonzalez MD  [facility]

## 2023-02-17 NOTE — DISCHARGE SUMMARY
Obstetrical Discharge Summary     Name: Purnima Tsang MRN: [id number]  SSN: [id number]    YOB: 1995  Age: 24 y.o. Sex: female      Allergies: Patient has no known allergies. Admit Date: 2/15/2023    Discharge Date: 2023     Admitting Physician: Jonathan Mon MD     Attending Physician:  My Bauer DO     * Admission Diagnoses: 39 weeks gestation of pregnancy [Z3A.39]    * Discharge Diagnoses:   Information for the patient's :  Jim Lee, Hannah Ha [[id number]]   Delivery of a 3.42 kg male infant via Vaginal, Vacuum (Extractor) on 2/15/2023 at 9:20 PM  by Sarah Lang. Apgars were 9  and 9 . Right mediolateral episiotomy  2nd degree laceration     Additional Diagnoses:   Hospital Problems as of 2023 Date Reviewed: 2/15/2023            Codes Class Noted - Resolved POA    39 weeks gestation of pregnancy ICD-10-CM: Z3A.39  ICD-9-CM: V22.2  2/15/2023 - Present Yes          Lab Results   Component Value Date/Time    ABO/Rh(D) A POSITIVE 02/15/2023 02:47 AM    Rubella, External immune 2022 12:00 AM    GrBStrep, External negative 2023 12:00 AM    ABO,Rh A positive 2022 12:00 AM    There is no immunization history for the selected administration types on file for this patient. * Procedures:   VAVD       * Discharge Condition: good    [facility] Course: Normal hospital course following the delivery. * Disposition: Home    Discharge Medications:   Current Discharge Medication List          * Follow-up Care/Patient Instructions:   Activity: Activity as tolerated  Diet: Regular Diet  Wound Care: As directed    Follow-up Information    None

## 2023-02-17 NOTE — LACTATION NOTE
This note was copied from a baby's chart. Assisted mother with deepening the latch. Discharge info reviewed, printed material given. Chart shows numerous feedings, void, stool WNL. Discussed importance of monitoring outputs and feedings on first week of life. Discussed ways to tell if baby is  getting enough breast milk, ie  voids and stools, change in color of stool, and return to birth wt within 2 weeks. Follow up with pediatrician visit for weight check in 1-2 days (per AAP guidelines.)  Encouraged to call Warm Line  [phone]  for any questions/problems that arise. Mother also given breastfeeding support group dates and times for any future needs     Engorgement Care Guidelines:  Reviewed how milk is made and normal phases of milk production. Taught care of engorged breasts - physiologic breastfeeding encouraged with use of cool packs (no ice directly on skin). Consider use of NSAIDS where appropriate for discomfort and inflammation. Can employ light touch, lymphatic drainage techniques on tender grandular tissues. Anticipatory guidance shared. Pt will successfully establish breastfeeding by feeding in response to early feeding cues   or wake every 3h, will obtain deep latch, and will keep log of feedings/output. Taught to BF at hunger cues and or q 2-3 hrs and to offer 10-20 drops of hand expressed colostrum at any non-feeds.       Breast Assessment  Left Breast: Medium, Large  Left Nipple: Everted, Intact, Compression stripe  Right Breast: Medium, Large  Right Nipple: Everted, Intact  Breast- Feeding Assessment  Attends Breast-Feeding Classes: No  Breast-Feeding Experience: No  Breast Trauma/Surgery: No  Type/Quality: Fair  Lactation Consultant Visits  Breast-Feedings: Good   Mother/Infant Observation  Mother Observation: Holds breast, Nipple round on release  Infant Observation: Rhythmic suck, Opens mouth, Lips flanged, lower, Lips flanged, upper, Latches nipple and aereolae, Relaxed after feeding, Frenulum checked  LATCH Documentation  Latch: Grasps breast, tongue down, lips flanged, rhythmic sucking  Audible Swallowing: A few with stimulation  Type of Nipple: Everted (after stimulation)  Comfort (Breast/Nipple): Filling, red/small blisters/bruises, mild/mod discomfort  Hold (Positioning): Full assist, teach one side, mother does other, staff holds  [facility] Score: 7

## 2023-02-17 NOTE — DISCHARGE INSTRUCTIONS
Discharge Instructions for Vaginal Delivery    Patient ID:  Isabelle Evangelista  [id number]  24 y.o.  1995    Take Home Medications       Continue taking your prenatal vitamins if you are breastfeeding. Follow-up care is a key part of your treatment and safety. Please schedule and keep appointments. Follow-up with your primary OB in 6 weeks. Activity  Avoid anything in your vagina for 6 weeks (no intercourse, tampons, or douching). You may drive unless you are taking prescription pain medications. Climbing stairs and light lifting are okay. Please avoid excessive exercise, though walking is okay- you'll be tired! Diet  Regular diet as tolerated. Be sure to drink plenty of fluids if you are breastfeeding. Wound care  If you have stitches, continue to rinse with a squirt bottle of warm water each time you void for about 7-10 days. .  Your stitches will gradually dissolve over four to eight weeks. Sitz baths are also helpful to keep the wound clean, encourage healing, and to help with pain associated with the stitches or hemorrhoids. You can use either a sitz bath basin or a bathtub filled with 2-3\" inches of plain warm water. Soak for 10 minutes 3 times a day as tolerated. Pain Management  Over the counter medications such as Tylenol and ibuprofen (Motrin or Advil) are ideal.  These may be taken together, alternating doses. You may  take the maximum dose:  Motrin or Advil (generic ibuprofen), either 3 tablets every 6 hours or 4 tablets every 8 hours or Tylenol (acetominophen) 1000mg every 6 hours (equivalent to 2 extra strength Tylenol). You may also have a precrescription for stronger pain medication. Take only as needed and transition to over the counter medication in the next few days. Minimize amounts of the prescription medication, as it can be habit-forming and will worsen or cause constipation.  Most patients will find that within a couple of days, their pain is adequately controlled using only over-the-counter medications. The prescription pain medication is mixed with Tylenol, therefore, you should not take any extra Tylenol or acetaminophen until you have reduced your prescription pain medication. Add heating pad or sitz baths as needed. Add hemorrhoid wipes or ointments if needed    Constipation  Constipation is normal after pregnancy and delivery, especially while taking prescription narcotic pain medication. Over the counter remedies including ducosate (Colace), take 1-2 capsules 1-2 times daily for soft stool as needed. You may also add/ try milk of magnesia or rectal remedies such as Dulcolax or Fleets enema. Recovery: What to Expect at Home  Fatigue is expected. Try to rest when you can and don't worry about doing housework or other tasks which can wait. The soreness along your bottom will improve significantly over the first 2 weeks, but it may take 6 weeks before you are completely recovered. Back pain or general body aches or muscle soreness are expected and should improve with acetominophen or ibuprofen. Leg swelling due to pregnancy and/or IV fluids given in the hospital will take about two weeks to resolve. Most women experience some form of the \"Baby Blues\" after having a baby. Feeling emotional, tearful, frustrated, anxious, sad, and irritable some of the time is normal and go away after about 2 weeks. Adequate rest and help from your family will help. Take breaks from caring for the baby. Call your doctor if your symptoms seem severe, last more than 2 weeks, or seem to be getting worse instead of better. Get help immediately if you have thoughts of wanting to hurt yourself or others! Call your doctor or seek immediate medical care if you have:  Heavy vaginal bleeding, soaking through one or more pads an hour for several hours. Foul-smelling discharge from your vagina or incision.   Consistent nausea and vomiting and cannot keep fluids down.  Consistent pain that does not get better after you take pain medicine.   Sudden chest pain and shortness of breath  Signs of a blood clot: pain/ swelling/ increasing redness in your lower extremeties  Signs of infection: increased pain in your abdomen or vaginal area; red streaks, warmth, or tenderness of your breasts; fever of 100.5 F or greater

## 2023-02-17 NOTE — ROUTINE PROCESS
Patient off unit in stable condition via wheelchair with volunteers for discharge home per Dr. Dmitri Bill. Patient is to follow up in 6 weeks and is aware. Patient denies any headache, dizziness, nausea/vomitting, or pain at this time. Infant in car seat with mother.